# Patient Record
Sex: FEMALE | Race: WHITE | NOT HISPANIC OR LATINO | Employment: FULL TIME | URBAN - METROPOLITAN AREA
[De-identification: names, ages, dates, MRNs, and addresses within clinical notes are randomized per-mention and may not be internally consistent; named-entity substitution may affect disease eponyms.]

---

## 2020-11-06 PROBLEM — I47.1 PAROXYSMAL SVT (SUPRAVENTRICULAR TACHYCARDIA) (HCC): Status: ACTIVE | Noted: 2020-11-06

## 2020-11-06 PROBLEM — I47.10 PAROXYSMAL SVT (SUPRAVENTRICULAR TACHYCARDIA): Status: ACTIVE | Noted: 2020-11-06

## 2020-11-06 NOTE — PROGRESS NOTES
OFFICE VISIT  NOTE  [facility] CARDIOLOGY      Name: Adeola Gutierrez    Date: 2020  MRN:  [id number]  :  1960      REFERRING/PRIMARY PROVIDER:  Lena Damon MD    Chief Complaint   Patient presents with   • Irregular Heart Beat     Consult       HPI: Adeola Gutierrez is a 60 y.o. female who presents today for new consultation for SVT.  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  History of longstanding murmur, echo 10/20/2020 [facility] showed EF 65% with mild TR, grade 1 diastolic dysfunction.  She reports worsening palpitations approximately 2 months ago, correlated with starting Avastin shots for eye histoplasmosis.  She took quite a few supplements that she is vegan, and also for her digestion, she stopped all supplements, palpitations improved.  Last Avastin shot was 1 month ago and palpitations have been rare since then.  When palpitations were occurring they are occurring several times throughout the day, felt like irregular or a pounding heart sensation, they were different than the palpitation she had several years ago they were evaluated [facility].  She had Holter monitor for 24 hours 10/ 20 that showed rare symptomatic PACs, and rare SVT.  Denies chest pain or shortness of breath, exercises occasionally.    Past Medical History:   Diagnosis Date   • Cancer (CMS/HCC)     Right Breast   • Depression    • Heart murmur    • Histoplasmosis    • History of echocardiogram    • History of Holter monitoring    • Irregular heart beats        Past Surgical History:   Procedure Laterality Date   • DIAGNOSTIC LAPAROSCOPY      cyst   • HYSTERECTOMY     • LYMPHADENECTOMY      partial       Social History     Socioeconomic History   • Marital status:      Spouse name: Not on file   • Number of children: Not on file   • Years of education: Not on file   • Highest education level: Not on file    Tobacco Use   • Smoking status: Never Smoker   • Smokeless tobacco: Never Used   Substance and Sexual Activity   • Alcohol use: Yes     Alcohol/week: 1.0 - 2.0 standard drinks     Types: 1 - 2 Glasses of wine per week     Comment: weekly   • Drug use: Never   • Sexual activity: Defer       Family History   Problem Relation Age of Onset   • Cancer Mother    • Aneurysm Father    • Cancer Father    • Hypertension Sister    • Stroke Brother    • Hypertension Brother         ROS:   Constitutional no fever,  no weight loss   Skin no rash, no subcutaneous nodules   Otolaryngeal no difficulty swallowing   Cardiovascular See HPI   Pulmonary no cough, no sputum production   Gastrointestinal no constipation, no diarrhea   Genitourinary no dysuria, no hematuria   Hematologic no easy bruisability, no abnormal bleeding   Musculoskeletal no muscle pain   Neurologic no dizziness, no falls         Allergies   Allergen Reactions   • Amoxicillin Nausea And Vomiting   • Penicillins Hives         Current Outpatient Medications:   •  acetaminophen (TYLENOL) 500 MG tablet, Take 500 mg by mouth As Needed for Mild Pain ., Disp: , Rfl:   •  Beclomethasone Diprop Monohyd (VANCENASE AQ DOUBLE STRENGTH NA), into the nostril(s) as directed by provider Daily., Disp: , Rfl:   •  Boswellia Sridhar (BOSWELLIA PO), Take  by mouth As Needed., Disp: , Rfl:   •  Cholecalciferol (Vitamin D3) 30 MCG/15ML liquid, Take  by mouth. Vit D and Vit K, Disp: , Rfl:   •  guaiFENesin (MUCINEX) 600 MG 12 hr tablet, Take 1,200 mg by mouth 2 (Two) Times a Day., Disp: , Rfl:   •  Magnesium Glycinate 665 MG capsule, Daily., Disp: , Rfl:   •  NON FORMULARY, Marshmellow Root  And Digestive Enzyme, Disp: , Rfl:   •  vitamin B-12 (cyancobalamin) 100 MCG tablet, 1 (One) Time Per Week., Disp: , Rfl:   •  Zinc Sulfate (ZINC-220 PO), Take  by mouth 2 (Two) Times a Day., Disp: , Rfl:     Vitals:    11/09/20 1447   BP: 124/72   BP Location: Left arm   Patient Position: Sitting  "  Pulse: 52   Temp: 97.8 °F (36.6 °C)   SpO2: 97%   Weight: 70.3 kg (155 lb)   Height: 165.1 cm (65\")     Body mass index is 25.79 kg/m².    PHYSICAL EXAM:    General Appearance:   · well developed  · well nourished  HENT:   · oropharynx moist  · lips not cyanotic  Neck:  · thyroid not enlarged  · supple  Respiratory:  · no respiratory distress  · normal breath sounds  · no rales  Cardiovascular:  · no jugular venous distention  · regular rhythm  · apical impulse normal  · S1 normal, S2 normal  · no S3, no S4   · 2/6 systolic murmur  · no rub, no thrill  · carotid pulses normal; no bruit  · pedal pulses normal  · lower extremity edema: none    Gastrointestinal:   · bowel sounds normal  · non-tender  · no hepatomegaly, no splenomegaly  Musculoskeletal:  · no clubbing of fingers.   · normocephalic, head atraumatic  Skin:   · warm, dry  Psychiatric:  · judgement and insight appropriate  · normal mood and affect    RESULTS:     ECG 12 Lead    Date/Time: 11/9/2020 3:46 PM  Performed by: Stephane Sy MD  Authorized by: Stephane Sy MD   Comparison: not compared with previous ECG   Previous ECG: no previous ECG available  Rhythm: sinus bradycardia  Rate: bradycardic  BPM: 46    Clinical impression: non-specific ECG  Comments: Poor R wave progression.                   Labs:  No results found for: CHOL, TRIG, HDL, LDL, AST, ALT  No results found for: HGBA1C  No components found for: CREATINININE  No results found for: EGFRIFNONA      ASSESSMENT:  Problem List Items Addressed This Visit        Cardiovascular and Mediastinum    Paroxysmal SVT (supraventricular tachycardia) (CMS/HCC) - Primary    Overview     10/2020 24-hour Holter: Occ SVT, average HR 83    10/20/20 Echo: LVEF 65%, grade I diastolic dysfunction         Heart murmur    Overview     10/20/2020 [facility] at [facility]: EF 65%, grade 1 diastolic dysfunction mild TR.           Other Visit Diagnoses     Palpitations              PLAN:    1.  Paroxysmal " SVT:  Symptoms are tolerable, she has a low resting heart rate, average 63 on recent Holter monitor  Normal structural heart by echo 10/2020  Continue monitoring  Decrease caffeine increase water increase sleep.  Increase exercise    2.  Sinus bradycardia:  Relatively asymptomatic  Average heart rate 63 on Holter monitor  Continue monitoring    3.  Murmur:  Likely TR, echo report reviewed, benign    Return to clinic in 6 months, or sooner as needed.    Thank you for the opportunity to share in the care of your patient; please do not hesitate to call me with any questions.     Stephane Sy MD, [facility]  Office: [phone] [address] [place] [address]    11/09/20

## 2020-11-09 ENCOUNTER — CONSULT (OUTPATIENT)
Dept: CARDIOLOGY | Facility: CLINIC | Age: 60
End: 2020-11-09

## 2020-11-09 VITALS
HEART RATE: 52 BPM | BODY MASS INDEX: 25.83 KG/M2 | HEIGHT: 65 IN | TEMPERATURE: 97.8 F | DIASTOLIC BLOOD PRESSURE: 72 MMHG | SYSTOLIC BLOOD PRESSURE: 124 MMHG | OXYGEN SATURATION: 97 % | WEIGHT: 155 LBS

## 2020-11-09 DIAGNOSIS — I47.1 PAROXYSMAL SVT (SUPRAVENTRICULAR TACHYCARDIA) (HCC): Primary | ICD-10-CM

## 2020-11-09 DIAGNOSIS — R00.2 PALPITATIONS: ICD-10-CM

## 2020-11-09 DIAGNOSIS — R01.1 HEART MURMUR: ICD-10-CM

## 2020-11-09 PROCEDURE — 93000 ELECTROCARDIOGRAM COMPLETE: CPT | Performed by: INTERNAL MEDICINE

## 2020-11-09 PROCEDURE — 99204 OFFICE O/P NEW MOD 45 MIN: CPT | Performed by: INTERNAL MEDICINE

## 2020-11-09 RX ORDER — MAGNESIUM GLYCINATE 100 MG
CAPSULE ORAL DAILY
COMMUNITY

## 2020-11-09 RX ORDER — ACETAMINOPHEN 500 MG
500 TABLET ORAL AS NEEDED
COMMUNITY

## 2020-11-09 RX ORDER — UBIDECARENONE 75 MG
100 CAPSULE ORAL DAILY
COMMUNITY

## 2020-11-09 RX ORDER — GUAIFENESIN 600 MG/1
1200 TABLET, EXTENDED RELEASE ORAL 2 TIMES DAILY
COMMUNITY

## 2020-11-09 RX ORDER — MULTIVIT-MIN/FERROUS FUMARATE 9 MG/15 ML
LIQUID (ML) ORAL
COMMUNITY

## 2021-05-10 ENCOUNTER — OFFICE VISIT (OUTPATIENT)
Dept: CARDIOLOGY | Facility: CLINIC | Age: 61
End: 2021-05-10

## 2021-05-10 VITALS
DIASTOLIC BLOOD PRESSURE: 66 MMHG | SYSTOLIC BLOOD PRESSURE: 104 MMHG | HEIGHT: 65 IN | BODY MASS INDEX: 26.69 KG/M2 | OXYGEN SATURATION: 96 % | WEIGHT: 160.2 LBS | HEART RATE: 51 BPM

## 2021-05-10 DIAGNOSIS — R00.2 PALPITATIONS: Primary | ICD-10-CM

## 2021-05-10 PROCEDURE — 99213 OFFICE O/P EST LOW 20 MIN: CPT | Performed by: INTERNAL MEDICINE

## 2021-05-10 RX ORDER — OLIVE LEAF EXTRACT 250 MG
CAPSULE ORAL 2 TIMES DAILY
COMMUNITY

## 2021-05-10 RX ORDER — ESTRADIOL 2 MG/1
RING VAGINAL
COMMUNITY
Start: 2021-04-12

## 2021-05-10 RX ORDER — METRONIDAZOLE 7.5 MG/G
GEL TOPICAL AS NEEDED
COMMUNITY
Start: 2021-03-11

## 2021-05-10 NOTE — PROGRESS NOTES
OFFICE VISIT  NOTE  [facility] CARDIOLOGY      Name: Adeola Gutierrez    Date: 5/10/2021  MRN:  [id number]  :  1960      REFERRING/PRIMARY PROVIDER:  Lena Damon MD    Chief Complaint   Patient presents with   • PSVT   • Heart Murmur       HPI: Adeola Gutierrez is a 60 y.o. female who presents today for new consultation for SVT.  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  History of longstanding murmur, echo 10/20/2020 [facility] showed EF 65% with mild TR, grade 1 diastolic dysfunction.  Palpitations stopped after a year she stopped getting eye injections.  She is walking 2-1/2 miles per day, no chest pain or shortness of breath with this activity.    Past Medical History:   Diagnosis Date   • Cancer (CMS/HCC)     Right Breast   • Depression    • Heart murmur    • Histoplasmosis    • History of echocardiogram    • History of Holter monitoring    • Irregular heart beats        Past Surgical History:   Procedure Laterality Date   • DIAGNOSTIC LAPAROSCOPY      cyst   • HYSTERECTOMY     • LYMPHADENECTOMY      partial       Social History     Socioeconomic History   • Marital status:      Spouse name: Not on file   • Number of children: Not on file   • Years of education: Not on file   • Highest education level: Not on file   Tobacco Use   • Smoking status: Never Smoker   • Smokeless tobacco: Never Used   Vaping Use   • Vaping Use: Never used   Substance and Sexual Activity   • Alcohol use: Yes     Alcohol/week: 1.0 - 2.0 standard drinks     Types: 1 - 2 Glasses of wine per week     Comment: weekly   • Drug use: Never   • Sexual activity: Defer       Family History   Problem Relation Age of Onset   • Cancer Mother    • Aneurysm Father    • Cancer Father    • Hypertension Sister    • Stroke Brother    • Hypertension Brother         ROS:   Constitutional no fever,  no weight loss   Skin no rash, no  "subcutaneous nodules   Otolaryngeal no difficulty swallowing   Cardiovascular See HPI   Pulmonary no cough, no sputum production   Gastrointestinal no constipation, no diarrhea   Genitourinary no dysuria, no hematuria   Hematologic no easy bruisability, no abnormal bleeding   Musculoskeletal no muscle pain   Neurologic no dizziness, no falls         Allergies   Allergen Reactions   • Amoxicillin Nausea And Vomiting   • Penicillins Hives         Current Outpatient Medications:   •  acetaminophen (TYLENOL) 500 MG tablet, Take 500 mg by mouth As Needed for Mild Pain ., Disp: , Rfl:   •  Beclomethasone Diprop Monohyd (VANCENASE AQ DOUBLE STRENGTH NA), into the nostril(s) as directed by provider Daily., Disp: , Rfl:   •  Boswellia Sridhar (BOSWELLIA PO), Take  by mouth As Needed., Disp: , Rfl:   •  Cholecalciferol (Vitamin D3) 30 MCG/15ML liquid, Take  by mouth. Vit D and Vit K, Disp: , Rfl:   •  Estring 2 MG vaginal ring, , Disp: , Rfl:   •  guaiFENesin (MUCINEX) 600 MG 12 hr tablet, Take 1,200 mg by mouth 2 (Two) Times a Day., Disp: , Rfl:   •  Magnesium Glycinate 665 MG capsule, Daily., Disp: , Rfl:   •  metroNIDAZOLE (METROGEL) 0.75 % gel, As Needed., Disp: , Rfl:   •  Olive Leaf Extract 250 MG capsule, Take  by mouth 2 (two) times a day., Disp: , Rfl:   •  Probiotic Product (PROBIOTIC-10 PO), Take  by mouth Daily., Disp: , Rfl:   •  Unable to find, 1 each 3 (Three) Times a Day. Med Name: ALEXANDREA, Disp: , Rfl:   •  vitamin B-12 (cyancobalamin) 100 MCG tablet, 100 mcg Daily., Disp: , Rfl:     Vitals:    05/10/21 1544   BP: 104/66   BP Location: Left arm   Patient Position: Sitting   Pulse: 51   SpO2: 96%   Weight: 72.7 kg (160 lb 3.2 oz)   Height: 165.1 cm (65\")     Body mass index is 26.66 kg/m².    PHYSICAL EXAM:    General Appearance:   · well developed  · well nourished  HENT:   · oropharynx moist  · lips not cyanotic  Neck:  · thyroid not enlarged  · supple  Respiratory:  · no respiratory distress  · normal " breath sounds  · no rales  Cardiovascular:  · no jugular venous distention  · regular rhythm  · apical impulse normal  · S1 normal, S2 normal  · no S3, no S4   · 2/6 systolic murmur  · no rub, no thrill  · carotid pulses normal; no bruit  · pedal pulses normal  · lower extremity edema: none    Gastrointestinal:   · bowel sounds normal  · non-tender  · no hepatomegaly, no splenomegaly  Musculoskeletal:  · no clubbing of fingers.   · normocephalic, head atraumatic  Skin:   · warm, dry  Psychiatric:  · judgement and insight appropriate  · normal mood and affect    RESULTS:   Procedures          Labs:  No results found for: CHOL, TRIG, HDL, LDL, AST, ALT  No results found for: HGBA1C  No components found for: CREATINININE  No results found for: EGFRIFNONA      ASSESSMENT:  Problem List Items Addressed This Visit     None          PLAN:    1.  Paroxysmal SVT:  Symptoms resolved after I injection stopped 11/2020  Normal structural heart by echo 10/2020    Advised patient to call us if symptoms return.    2.  Sinus bradycardia:  Relatively asymptomatic  Average heart rate 63 on Holter monitor  Continue monitoring    3.  Murmur:  Likely TR, echo report reviewed, benign    Return to clinic as needed.    Thank you for the opportunity to share in the care of your patient; please do not hesitate to call me with any questions.     Stephane Sy MD, [facility]  Office: [phone]  [facility] [place] [address]    05/10/21

## 2024-09-19 ENCOUNTER — OFFICE VISIT (OUTPATIENT)
Dept: CARDIOLOGY | Facility: CLINIC | Age: 64
End: 2024-09-19
Payer: COMMERCIAL

## 2024-09-19 VITALS
BODY MASS INDEX: 27.36 KG/M2 | OXYGEN SATURATION: 96 % | SYSTOLIC BLOOD PRESSURE: 100 MMHG | HEART RATE: 59 BPM | DIASTOLIC BLOOD PRESSURE: 60 MMHG | HEIGHT: 65 IN | WEIGHT: 164.2 LBS

## 2024-09-19 DIAGNOSIS — R01.1 HEART MURMUR: ICD-10-CM

## 2024-09-19 DIAGNOSIS — I47.10 PAROXYSMAL SVT (SUPRAVENTRICULAR TACHYCARDIA): Primary | ICD-10-CM

## 2024-09-19 DIAGNOSIS — R06.09 DOE (DYSPNEA ON EXERTION): ICD-10-CM

## 2024-09-19 PROCEDURE — 99204 OFFICE O/P NEW MOD 45 MIN: CPT | Performed by: INTERNAL MEDICINE

## 2024-09-19 PROCEDURE — 93000 ELECTROCARDIOGRAM COMPLETE: CPT | Performed by: INTERNAL MEDICINE

## 2024-10-14 ENCOUNTER — HOSPITAL ENCOUNTER (OUTPATIENT)
Dept: CARDIOLOGY | Facility: HOSPITAL | Age: 64
Discharge: HOME | End: 2024-10-14
Admitting: INTERNAL MEDICINE
Payer: COMMERCIAL

## 2024-10-14 VITALS
DIASTOLIC BLOOD PRESSURE: 77 MMHG | SYSTOLIC BLOOD PRESSURE: 145 MMHG | WEIGHT: 164.24 LBS | HEIGHT: 65 IN | BODY MASS INDEX: 27.36 KG/M2

## 2024-10-14 DIAGNOSIS — R01.1 HEART MURMUR: ICD-10-CM

## 2024-10-14 DIAGNOSIS — I47.10 PAROXYSMAL SVT (SUPRAVENTRICULAR TACHYCARDIA): ICD-10-CM

## 2024-10-14 DIAGNOSIS — R06.09 DOE (DYSPNEA ON EXERTION): ICD-10-CM

## 2024-10-14 PROCEDURE — 93306 TTE W/DOPPLER COMPLETE: CPT

## 2024-10-14 PROCEDURE — 93306 TTE W/DOPPLER COMPLETE: CPT | Performed by: INTERNAL MEDICINE

## 2024-10-15 LAB
BH CV ECHO MEAS - AO MAX PG: 11.2 MMHG
BH CV ECHO MEAS - AO ROOT DIAM: 2.7 CM
BH CV ECHO MEAS - AO V2 MAX: 167.4 CM/SEC
BH CV ECHO MEAS - AVA(I,D): 2 CM2
BH CV ECHO MEAS - EF(MOD-BP): 58 %
BH CV ECHO MEAS - LA DIMENSION: 3.2 CM
BH CV ECHO MEAS - LAT PEAK E' VEL: 8.7 CM/SEC
BH CV ECHO MEAS - LV MAX PG: 4.6 MMHG
BH CV ECHO MEAS - LV MEAN PG: 1.9 MMHG
BH CV ECHO MEAS - LV V1 MAX: 107.5 CM/SEC
BH CV ECHO MEAS - LV V1 VTI: 32.6 CM
BH CV ECHO MEAS - LVIDS: 2.7 CM
BH CV ECHO MEAS - LVOT AREA: 3.1 CM2
BH CV ECHO MEAS - LVOT DIAM: 1.6 CM
BH CV ECHO MEAS - MED PEAK E' VEL: 11.8 CM/SEC
BH CV ECHO MEAS - MV A MAX VEL: 96.5 CM/SEC
BH CV ECHO MEAS - MV E MAX VEL: 74 CM/SEC
BH CV ECHO MEAS - MV E/A: 0.77
BH CV ECHO MEAS - PA ACC TIME: 0.23 SEC
BH CV ECHO MEAS - RAP SYSTOLE: 3 MMHG
BH CV ECHO MEAS - RVSP: 31 MMHG
BH CV ECHO MEAS - SV(LVOT): 100.5 ML
BH CV ECHO MEAS - TAPSE (>1.6): 2.37 CM
BH CV ECHO MEAS - TR MAX PG: 27.8 MMHG
BH CV ECHO MEAS - TR MAX VEL: 263.7 CM/SEC
BH CV ECHO MEASUREMENTS AVERAGE E/E' RATIO: 7.22
BH CV XLRA - TDI S': 14.1 CM/SEC

## 2024-10-16 ENCOUNTER — TELEPHONE (OUTPATIENT)
Dept: CARDIOLOGY | Facility: CLINIC | Age: 64
End: 2024-10-16
Payer: COMMERCIAL

## 2024-10-16 NOTE — TELEPHONE ENCOUNTER
----- Message from Stephane Sy sent at 10/16/2024  1:49 PM EDT -----  Please inform the patient of their test results. Normal echo . Thank you.